# Patient Record
Sex: FEMALE | Race: BLACK OR AFRICAN AMERICAN | Employment: FULL TIME | URBAN - METROPOLITAN AREA
[De-identification: names, ages, dates, MRNs, and addresses within clinical notes are randomized per-mention and may not be internally consistent; named-entity substitution may affect disease eponyms.]

---

## 2021-05-09 ENCOUNTER — HOSPITAL ENCOUNTER (EMERGENCY)
Facility: HOSPITAL | Age: 56
Discharge: HOME | End: 2021-05-09
Attending: EMERGENCY MEDICINE
Payer: COMMERCIAL

## 2021-05-09 ENCOUNTER — APPOINTMENT (OUTPATIENT)
Dept: GENERAL RADIOLOGY | Facility: HOSPITAL | Age: 56
End: 2021-05-09
Attending: EMERGENCY MEDICINE
Payer: COMMERCIAL

## 2021-05-09 VITALS
WEIGHT: 204 LBS | HEART RATE: 86 BPM | SYSTOLIC BLOOD PRESSURE: 151 MMHG | OXYGEN SATURATION: 99 % | DIASTOLIC BLOOD PRESSURE: 94 MMHG | RESPIRATION RATE: 18 BRPM | TEMPERATURE: 97 F | HEIGHT: 65 IN | BODY MASS INDEX: 33.99 KG/M2

## 2021-05-09 DIAGNOSIS — M25.562 ACUTE PAIN OF LEFT KNEE: Primary | ICD-10-CM

## 2021-05-09 DIAGNOSIS — M19.90 ARTHRITIS: ICD-10-CM

## 2021-05-09 PROCEDURE — 99283 EMERGENCY DEPT VISIT LOW MDM: CPT

## 2021-05-09 PROCEDURE — 96372 THER/PROPH/DIAG INJ SC/IM: CPT

## 2021-05-09 PROCEDURE — 73560 X-RAY EXAM OF KNEE 1 OR 2: CPT | Performed by: EMERGENCY MEDICINE

## 2021-05-09 RX ORDER — DICLOFENAC SODIUM 75 MG/1
75 TABLET, DELAYED RELEASE ORAL 2 TIMES DAILY PRN
Qty: 20 TABLET | Refills: 0 | Status: SHIPPED | OUTPATIENT
Start: 2021-05-09

## 2021-05-09 RX ORDER — KETOROLAC TROMETHAMINE 30 MG/ML
60 INJECTION, SOLUTION INTRAMUSCULAR; INTRAVENOUS ONCE
Status: COMPLETED | OUTPATIENT
Start: 2021-05-09 | End: 2021-05-09

## 2021-05-09 NOTE — ED PROVIDER NOTES
Patient Seen in: [facility] AND [facility] Emergency Department    History   Patient presents with:  Knee Pain    Stated Complaint: left knee pain    HPI    Patient presents to the emergency department today with complaint of pain to her left knee.   She states h noted.  Eye:  No scleral icterus. Eyelids appear normal, no lesions. Musculoskeletal:  Good muscle tone.   Left knee there is no swelling she is diffusely tender distal perfusion intact she is able to lift up the leg and bend it to approximately 45 degree re-check      Medications Prescribed:  Discharge Medication List as of 5/9/2021  2:10 PM    START taking these medications    Diclofenac Sodium 75 MG Oral Tab EC  Take 1 tablet (75 mg total) by mouth 2 (two) times daily as needed. , Print, Disp-20 tablet, R

## 2021-05-09 NOTE — ED QUICK NOTES
At time of discharge, Spencer Magallanes is alert, oriented, speech clear, respirations regular and nonlabored. She is able to dress herself and ambulates unassisted with steady gait.  She verbalizes understanding of discharge instructions including prescribed medication